# Patient Record
Sex: FEMALE | Race: WHITE | URBAN - NONMETROPOLITAN AREA
[De-identification: names, ages, dates, MRNs, and addresses within clinical notes are randomized per-mention and may not be internally consistent; named-entity substitution may affect disease eponyms.]

---

## 2017-01-01 ENCOUNTER — AMBULATORY - GICH (OUTPATIENT)
Dept: SCHEDULING | Facility: OTHER | Age: 82
End: 2017-01-01

## 2017-01-01 ENCOUNTER — COMMUNICATION - GICH (OUTPATIENT)
Dept: FAMILY MEDICINE | Facility: OTHER | Age: 82
End: 2017-01-01

## 2017-01-01 ENCOUNTER — AMBULATORY - GICH (OUTPATIENT)
Dept: LAB | Facility: OTHER | Age: 82
End: 2017-01-01

## 2017-01-01 ENCOUNTER — HISTORY (OUTPATIENT)
Dept: EMERGENCY MEDICINE | Facility: OTHER | Age: 82
End: 2017-01-01

## 2017-01-01 ENCOUNTER — COMMUNICATION - GICH (OUTPATIENT)
Dept: INTERNAL MEDICINE | Facility: OTHER | Age: 82
End: 2017-01-01

## 2017-01-01 ENCOUNTER — ONCOLOGY VISIT (OUTPATIENT)
Dept: RADIATION ONCOLOGY | Facility: HOSPITAL | Age: 82
End: 2017-01-01
Attending: RADIOLOGY
Payer: COMMERCIAL

## 2017-01-01 ENCOUNTER — OFFICE VISIT - GICH (OUTPATIENT)
Dept: INTERNAL MEDICINE | Facility: OTHER | Age: 82
End: 2017-01-01

## 2017-01-01 ENCOUNTER — TRANSFERRED RECORDS (OUTPATIENT)
Dept: HEALTH INFORMATION MANAGEMENT | Facility: HOSPITAL | Age: 82
End: 2017-01-01

## 2017-01-01 ENCOUNTER — HISTORY (OUTPATIENT)
Dept: INTERNAL MEDICINE | Facility: OTHER | Age: 82
End: 2017-01-01

## 2017-01-01 VITALS
HEART RATE: 76 BPM | WEIGHT: 173.2 LBS | DIASTOLIC BLOOD PRESSURE: 64 MMHG | BODY MASS INDEX: 28.86 KG/M2 | SYSTOLIC BLOOD PRESSURE: 150 MMHG | HEIGHT: 65 IN | RESPIRATION RATE: 20 BRPM

## 2017-01-01 DIAGNOSIS — I10 ESSENTIAL (PRIMARY) HYPERTENSION: ICD-10-CM

## 2017-01-01 DIAGNOSIS — R55 SYNCOPE AND COLLAPSE: ICD-10-CM

## 2017-01-01 DIAGNOSIS — E87.6 HYPOKALEMIA: ICD-10-CM

## 2017-01-01 DIAGNOSIS — R59.0 LOCALIZED ENLARGED LYMPH NODES: ICD-10-CM

## 2017-01-01 DIAGNOSIS — C34.91 MALIGNANT NEOPLASM OF UNSPECIFIED PART OF RIGHT BRONCHUS OR LUNG (H): ICD-10-CM

## 2017-01-01 DIAGNOSIS — D53.9 NUTRITIONAL ANEMIA: ICD-10-CM

## 2017-01-01 DIAGNOSIS — C34.90 MALIGNANT NEOPLASM OF UNSPECIFIED PART OF UNSPECIFIED BRONCHUS OR LUNG (H): ICD-10-CM

## 2017-01-01 DIAGNOSIS — C34.91 MALIGNANT NEOPLASM OF RIGHT LUNG, UNSPECIFIED PART OF LUNG (H): Primary | ICD-10-CM

## 2017-01-01 DIAGNOSIS — E78.5 HYPERLIPIDEMIA: ICD-10-CM

## 2017-01-01 LAB
MAGNESIUM SERPL-MCNC: 1.9 MG/DL (ref 1.9–2.7)
POTASSIUM SERPL-SCNC: 4.2 MMOL/L (ref 3.5–5.1)

## 2017-01-01 PROCEDURE — 99213 OFFICE O/P EST LOW 20 MIN: CPT | Performed by: RADIOLOGY

## 2017-01-01 RX ORDER — ISOSORBIDE DINITRATE 30 MG/1
TABLET ORAL
COMMUNITY

## 2017-01-01 RX ORDER — ROSUVASTATIN CALCIUM 40 MG/1
40 TABLET, COATED ORAL
COMMUNITY

## 2017-01-01 RX ORDER — ASPIRIN 325 MG
325 TABLET ORAL
COMMUNITY
Start: 2014-07-23

## 2017-01-01 RX ORDER — CYANOCOBALAMIN (VITAMIN B-12) 250 MCG
250 TABLET ORAL
COMMUNITY
Start: 2013-04-02

## 2017-01-01 RX ORDER — METHYLPREDNISOLONE 16 MG/1
16 TABLET ORAL
COMMUNITY

## 2017-01-01 RX ORDER — AMOXICILLIN 500 MG
CAPSULE ORAL
COMMUNITY

## 2017-01-01 RX ORDER — LORAZEPAM 0.5 MG/1
TABLET ORAL
COMMUNITY
Start: 2016-01-01

## 2017-01-01 RX ORDER — NITROGLYCERIN 0.4 MG/1
0.4 TABLET SUBLINGUAL
COMMUNITY
Start: 2016-01-01

## 2017-01-01 RX ORDER — ASCORBIC ACID 500 MG
500 TABLET ORAL
COMMUNITY

## 2017-01-01 RX ORDER — SENNOSIDES 8.6 MG
650 CAPSULE ORAL
COMMUNITY

## 2017-01-01 RX ORDER — FOLIC ACID 0.8 MG
800 TABLET ORAL
COMMUNITY
Start: 2016-01-01

## 2017-01-01 RX ORDER — VALSARTAN 320 MG/1
320 TABLET ORAL
COMMUNITY

## 2017-01-01 RX ORDER — METOPROLOL SUCCINATE 100 MG/1
100 TABLET, EXTENDED RELEASE ORAL
COMMUNITY

## 2017-01-01 ASSESSMENT — PATIENT HEALTH QUESTIONNAIRE - PHQ9
SUM OF ALL RESPONSES TO PHQ QUESTIONS 1-9: 10
SUM OF ALL RESPONSES TO PHQ QUESTIONS 1-9: 0

## 2017-01-01 ASSESSMENT — PAIN SCALES - GENERAL: PAINLEVEL: NO PAIN (0)

## 2017-01-09 PROBLEM — C34.90 LUNG CANCER (H): Status: ACTIVE | Noted: 2017-01-01

## 2017-01-09 NOTE — MR AVS SNAPSHOT
"              After Visit Summary   2017    Fatou Yadav    MRN: [id number]           Patient Information     Date Of Birth          1935        Visit Information        Provider Department      2017 1:15 PM Sampson Dixon MD [facility]        Today's Diagnoses     Malignant neoplasm of right lung, unspecified part of lung ([facility])    -  1        Follow-ups after your visit        Who to contact     If you have questions or need follow up information about today's clinic visit or your schedule please contact [facility] directly at [phone].  Normal or non-critical lab and imaging results will be communicated to you by Tychehart, letter or phone within 4 business days after the clinic has received the results. If you do not hear from us within 7 days, please contact the clinic through Macromill[employer] or phone. If you have a critical or abnormal lab result, we will notify you by phone as soon as possible.  Submit refill requests through [employer] or call your pharmacy and they will forward the refill request to us. Please allow 3 business days for your refill to be completed.          Additional Information About Your Visit        MyChart Information     [employer] lets you send messages to your doctor, view your test results, renew your prescriptions, schedule appointments and more. To sign up, go to [email or web address] . Click on \"Log in\" on the left side of the screen, which will take you to the Welcome page. Then click on \"Sign up Now\" on the right side of the page.     You will be asked to enter the access code listed below, as well as some personal information. Please follow the directions to create your username and password.     Your access code is: [email or web address]  Expires: 2017  1:55 PM     Your access code will  in 90 days. If you need help or a new code, please call your [facility] clinic or [phone].        Care EveryWhere ID     This is your Care EveryWhere ID. This " "could be used by other organizations to access your [place] medical records  [id number]        Your Vitals Were     Pulse Respirations Height BMI (Body Mass Index)          76 20 1.651 m (5' 5\") 28.82 kg/m2         Blood Pressure from Last 3 Encounters:   01/09/17 150/64    Weight from Last 3 Encounters:   01/09/17 78.563 kg (173 lb 3.2 oz)              Today, you had the following     No orders found for display       Primary Care Provider    None Specified       No primary provider on file.        Thank you!     Thank you for choosing [facility]  for your care. Our goal is always to provide you with excellent care. Hearing back from our patients is one way we can continue to improve our services. Please take a few minutes to complete the written survey that you may receive in the mail after your visit with us. Thank you!             Your Updated Medication List - Protect others around you: Learn how to safely use, store and throw away your medicines at www.disposemymeds.org.          This list is accurate as of: 1/9/17  1:55 PM.  Always use your most recent med list.                   Brand Name Dispense Instructions for use    ascorbic acid 500 MG tablet    VITAMIN C     Take 500 mg by mouth       aspirin 325 MG tablet      Take 325 mg by mouth       B-12 250 MCG Tabs      Take 250 mcg by mouth       calcium carbonate-vitamin D 600-200 MG-UNIT Tabs          COMPAZINE PO      Take 10 mg by mouth every 6 hours as needed for nausea       CRESTOR 40 MG tablet   Generic drug:  rosuvastatin      40 mg       Fish Oil 1200 MG Caps          folic acid 800 MCG Tabs      Take 800 mcg by mouth       isosorbide dinitrate 30 MG tablet    ISORDIL         LORazepam 0.5 MG tablet    ATIVAN         MAGIC MOUTHWASH (DUKE) (FV COMPOUNDED) SUSPENSION      5 mLs       magnesium citrate solution          methylPREDNISolone 16 MG tablet    MEDROL     Take 16 mg by mouth       nitroglycerin 0.4 MG sublingual tablet    " NITROSTAT     Place 0.4 mg under the tongue       PRESERVISION AREDS 2 PO      Take 2 capsules by mouth       TOPROL  MG 24 hr tablet   Generic drug:  metoprolol      Take 100 mg by mouth       TYLENOL ARTHRITIS PAIN 650 MG CR tablet   Generic drug:  acetaminophen      Take 650 mg by mouth       valsartan 320 MG tablet    DIOVAN     Take 320 mg by mouth

## 2017-01-09 NOTE — PROGRESS NOTES
"INITIAL PATIENT ASSESSMENT    Referring Physician: Juan C  Other Physicians: Wanda Mathias; Domingo Greene    Diagnosis: Lung Cancer    Prior radiation therapy: None    Prior chemotherapy:   Protocol: carbo/ premetrexed   Facility: [facility]  Dates: 16-16      Prior hormonal therapy:Yes: HRT x 10 yrs    Pain Eval:  Denies    Psychosocial  Marital Status:    Spouse/Significant other: n/a    Children: no living   Occupation: [employer] service/ [employer]    Retired: Yes  Living arrangements: home alone  Do you feel safe at home? Yes  Activity status: independent   referral needs: Not needed    Advanced Directive: Yes - Location: pt will bring a copy in    Breast Exam:   Mammogram:   Last Pap: many years  : 2  Para: 1  Onset of menarche: 15  LMP: No LMP recorded.  Onset of menopause: 34 (hysterectomy)  Abnormal vaginal bleeding/discharge: No  Are you pregnant? no  Reproductive note: n/a    Patient was assessed using the NCCN psychosocial distress thermometer. Patient rated the score as a 7/10. Patient rated current stressors as fatigue, \"I can't do what I want to, I haven't got the ambition\". Pt confides in her . Stressors will be brought to the attention of provider or Oncology RN Care Coordinator for a score of 6 or greater or per nurses discretion.     Pt is here today for a consult for radiation therapy for lung cancer.  Educated patient on the mapping process and the possible side effects of XRT to the chest, to include: fatigue, skin reaction, and esophogitis.  Pt verbalizes an understanding and has no questions at this time. Pt is accompanied by her two friends, today.      ROLevel 3- Verification of admission data and rooming completed.  Verification of medication and allergies completed.  Education per individual patient/family needs completed and documented.  Assessment and side-effect management completed.  Necessary information gathered and reported to " provider, time spent assisting patient or coordinating patient needs approximately 45 minutes.

## 2017-01-10 NOTE — CONSULTS
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      HISTORY OF PRESENT ILLNESS:  Fatou Yadav describes seeking Healthcare in the spring of 2016 with upper respiratory allergy symptoms and eventually seeing a physician for left-sided thoracic chest pain in 2016.  She was ultimately found to have a right lower lobe tumor with extensive mediastinal adenopathy, biopsies shown to be adenocarcinoma.  Because of the rather extensive volume of disease, Dr. Irizarry felt it was in her best interest to proceed with systemic therapy hoping for a response and considering possibly moving on to combined modality therapy in the future.  She has received several cycles, I believe, of carboplatin and pemetrexed and on imaging studies has been shown to have fairly marked progression of her disease.  She reports feeling fairly fatigued, but not having too much in the way of specific focal symptoms related to her malignancy.  She does have a tender mass in the right neck.      PAST MEDICAL HISTORY:  Hysterectomy in  which she describes being for cervical carcinoma, coronary bypass surgery, coronary stents.      INTERCURRENT ILLNESSES:  Coronary artery disease, hypertension, hyperlipidemia, also bladder carcinoma.  She is apparently not diabetic in spite of her ischemic vascular disease.  No clear history of MI or CVA.      ALLERGIES:  Amoxicillin, Plavix, morphine, niacin.      REPRODUCTIVE HISTORY:   2, para 1; menarche age 15; menopause, surgical, early 30s.      REVIEW OF SYSTEMS:  No diplopia, headaches, dizziness, loss of consciousness.  No swallowing difficulties, odynophagia, dysphagia.  No thermal or temperature intolerance.  No nausea, vomiting, heartburn.  No orthopnea, dyspnea, palpitations.  No fevers,  chills, night sweats.  Energy level is down significantly.  Denies any skeletal aches or pains.  Weight is down 35-40 pounds over the past 10 months.      FAMILY HISTORY:  Brother had prostate carcinoma.  Father, pancreatic carcinoma.  Mother had breast carcinoma.  She has also had a niece with breast carcinoma and a brother with some sort of malignancy which she describes involving his spleen.      HABITS:  Alcohol use, occasional.  Tobacco, quit in 1996.      SOCIAL AND DEMOGRAPHIC:  This patient is , has no living children.  She is retired from the [employer] and [employer].  Lives at home alone.      PHYSICAL EXAMINATION:   GENERAL:  Reveals a cooperative, fairly healthy-appearing female.   VITAL SIGNS:  Weight is 173.2 pounds, blood pressure 150/69, heart rate 76.   HEENT:  Extraocular movements full.  Pupils equal, round, reactive.  Face is symmetric.  Palate and tongue midline and symmetric.     NECK:  No left cervical or supraclavicular lymphadenopathy.  There is what feels like a very good-sized right lower neck mass, bilobed or perhaps 2 separate lymph node masses, quite tender, measuring up to 4-5 cm in greatest dimension.   LUNGS:  Auscultation of the lungs reveals fairly diffuse coarse rales in the right lung field posteriorly, left side is relatively clear.   HEART:  Cardiac exam reveals regular rate and rhythm without murmurs or extra sounds.   CHEST:  No obvious axillary lymphadenopathy.   ABDOMEN:  Nontender without appreciable organomegaly.  No inguinal lymphadenopathy.   EXTREMITIES:  Strength is intact.   NEUROLOGIC:  Deep tendon reflexes symmetric.      RADIOGRAPHIC FINDINGS:  Prechemotherapy imaging shows a large right perihilar soft tissue mass and mediastinal adenopathy, quite extensive.  Followup imaging after her treatment does show progression of this extensive right lower lobe malignancy, again with what appears to be central necrosis, interval development of pleural effusion,  more post-obstructive atelectasis of the right middle and lower lobes, and again extensive mediastinal adenopathy extending out of the mediastinum in the right inferior in the cervical region.        IMPRESSION:  Regionally extensive and advanced adenocarcinoma, presumably lung primary.  I certainly think at this time radiation therapy could only play a minor palliative role.  I would certainly think that if she could get a response to systemic treatment, that may be in her best interest.  She actually expresses desire for possible second opinion referral elsewhere.  She seems comfortable with our discussion and will continue with her care, as directed by Dr. Irizarry.  I would be happy to reevaluate her at any time.         TRINA FUENTES MD             D: 2017 14:39   T: 2017 16:02   MT: HILARIO      Name:     EUGENIO MENESES   MRN:      [id number]        Account:       [id number]   :      1935           Consult Date:  2017      Document: [id number]       cc: Wanda Irizarry MD

## 2017-02-28 ENCOUNTER — AMBULATORY - GICH (OUTPATIENT)
Dept: SCHEDULING | Facility: OTHER | Age: 82
End: 2017-02-28

## 2018-01-03 NOTE — TELEPHONE ENCOUNTER
Patient Information     Patient Name MRN Fatou Villafana [id number] Female 1935      Telephone Encounter by Natalya Castorena RN at 2017  2:44 PM     Author:  Natalya Castorena RN Service:  (none) Author Type:  NURS- Registered Nurse     Filed:  2017  2:56 PM Encounter Date:  2017 Status:  Signed     :  Natalya Castorena RN (NURS- Registered Nurse)            Edwige from [facility] , FYI -Patient did  the powder packets however K-dur that was sent over is not covered by insurance but Klor-Con M20 is.     1) there are 2 sets of directions- take 2 packets to equal 40 mEq for 5 days then 1 package daily     2)-the ordered later sent over for K-dur is to 2 tabs daily once daily       Pharmacy requests physician consideration and a callback today please      Natalya Castorena RN ....................  2017   2:54 PM

## 2018-01-03 NOTE — TELEPHONE ENCOUNTER
Patient Information     Patient Name MRN Fatou Villafana [id number] Female 1935      Telephone Encounter by Jasmin Hwang at 2017  9:55 AM     Author:  Jasmin Hwang Service:  (none) Author Type:  (none)     Filed:  2017  9:55 AM Encounter Date:  2017 Status:  Signed     :  Jasmin Hwang            Left a verbal message on Hospice machine  Jasmin Hwang ....................  2017   9:55 AM

## 2018-01-03 NOTE — TELEPHONE ENCOUNTER
Patient Information     Patient Name MRN Fatou Villafana [id number] Female 1935      Telephone Encounter by Jasmin Hwang at 2017  3:30 PM     Author:  Jasmin Hwang Service:  (none) Author Type:  (none)     Filed:  2017  3:31 PM Encounter Date:  2017 Status:  Signed     :  Jasmin Hwang            Taken care of.  Jasmin Hwang ....................  2017   3:31 PM

## 2018-01-03 NOTE — TELEPHONE ENCOUNTER
Patient Information     Patient Name MRN Fatou Villafana [id number] Female 1935      Telephone Encounter by Domingo Greene MD at 2017  5:27 PM     Author:  Domingo Greene MD Service:  (none) Author Type:  Physician     Filed:  2017  5:28 PM Encounter Date:  2017 Status:  Signed     :  Domingo Greene MD (Physician)            I was able to access patient's labs through care everywhere.  Her potassium yesterday was only 2.9.    This patient having issues with generalized weakness/fatigue, muscle spasms or leg cramps -- she may need to be hospitalized for her low potassium.    Domingo Greene MD

## 2018-01-03 NOTE — TELEPHONE ENCOUNTER
Patient Information     Patient Name MRN Fatou Villafana [id number] Female 1935      Telephone Encounter by Wanda Mathias MD at 2017 12:12 PM     Author:  Wanda Mathias MD Service:  (none) Author Type:  Physician     Filed:  2017 12:13 PM Encounter Date:  2017 Status:  Signed     :  Wanda Mathias MD (Physician)            New prescription for Potassium Chloride 20 meq tablets sent to [facility] pharmacy.  Wanda Mathias MD ....................  2017   12:13 PM

## 2018-01-03 NOTE — TELEPHONE ENCOUNTER
Patient Information     Patient Name MRN Fatou Villafana [id number] Female 1935      Telephone Encounter by Domingo Greene MD at 2017  4:21 PM     Author:  Domingo Greene MD Service:  (none) Author Type:  Physician     Filed:  2017  4:21 PM Encounter Date:  2017 Status:  Signed     :  Domingo Greene MD (Physician)            [facility] for hospice.     Domingo Greene MD

## 2018-01-03 NOTE — TELEPHONE ENCOUNTER
Patient Information     Patient Name MRN Sex Fatou Petit [id number] Female 1935      Telephone Encounter by Taryn Noriega at 2017  2:13 PM     Author:  Taryn Noriega Service:  (none) Author Type:  (none)     Filed:  2017  2:15 PM Encounter Date:  2017 Status:  Signed     :  Taryn Noriega            After the patient's name and date of birth were verified. Patient had stated that she has seen Dr. Irizarry in [place] and had recommend that patient starts taking potassium due to recent labs results. Patient is wondering if Domingo Greene MD will send in a prescription for potassium.

## 2018-01-03 NOTE — TELEPHONE ENCOUNTER
Patient Information     Patient Name MRFatou Sanchez [id number] Female 1935      Telephone Encounter by Kriss Mosley at 2017  8:15 AM     Author:  Kriss Mosley Service:  (none) Author Type:  (none)     Filed:  2017  8:17 AM Encounter Date:  2017 Status:  Signed     :  Kriss Mosley            Patient would like a prescription for a life alert device and for a walker that has wheels and a seat. Patient would like the prescriptions sent to [place].    Kriss Mosley ....................  2017   8:16 AM

## 2018-01-03 NOTE — TELEPHONE ENCOUNTER
Patient Information     Patient Name MRN Fatou Villafana [id number] Female 1935      Telephone Encounter by Sukhwinder Perez LPN at 2017  3:43 PM     Author:  Sukhwinder Perez LPN Service:  (none) Author Type:  NURS- Licensed Practical Nurse     Filed:  2017  3:43 PM Encounter Date:  2017 Status:  Signed     :  Sukhwinder Perez LPN (NURS- Licensed Practical Nurse)            Needs verbal order for Hospice. Please advise.  Sukhwinder Perez LPN ..............2017 3:43 PM

## 2018-01-03 NOTE — TELEPHONE ENCOUNTER
Patient Information     Patient Name MRN Fatou Villafana [id number] Female 1935      Telephone Encounter by Taryn Noriega at 2017  4:33 PM     Author:  Taryn Noriega Service:  (none) Author Type:  (none)     Filed:  2017  4:38 PM Encounter Date:  2017 Status:  Signed     :  Taryn Noriega            After the patient's name and date of birth were verified. Patient notified nurse that labs are coming from [facility] and were done 16. Patient did request that labs be sent to [facility]. Patient was notified that we have not received the labs from [facility] as of 2017.  Patient stated that if Domingo Greene MD would like her to come in have labs drawn here if that would be easier she would be more then willing to come in for a lab appointment or she will just wait until we receive labs and get recommendations from Domingo Greene MD. Please advise.  Taryn Noriega CMA ([facility])........2017 4:38 PM

## 2018-01-03 NOTE — TELEPHONE ENCOUNTER
Patient Information     Patient Name Fatou Barros [id number] Female 1935      Telephone Encounter by Earnestine Wing at 2017 11:02 AM     Author:  Earnestine Wing Service:  (none) Author Type:  (none)     Filed:  2017 11:03 AM Encounter Date:  2017 Status:  Signed     :  Earnestine Wing            Spoke with patient and she states she has started the potassium with a dose yesterday and one today.  She states she is getting worse and that she is extremely tired and very weak.  Would you like to see her sooner or what would you like her to do?  Earnestine Wing LPN........................2017  11:02 AM

## 2018-01-03 NOTE — PROGRESS NOTES
Patient Information     Patient Name MRN Fatou Villafana [id number] Female 1935      Progress Notes by Domingo Greene MD at 2017  8:50 AM     Author:  Domingo Greene MD Service:  (none) Author Type:  Physician     Filed:  2017  1:13 PM Encounter Date:  2017 Status:  Signed     :  Domingo Greene MD (Physician)            Nursing Notes:   Sandra Cox  2017  9:19 AM  Signed  Patient presents to the clinic for follow up with hypertension and ED visit on 17 for general weakness due to lung cancer.    Sandra Cox LPN        2017 9:04 AM    Fatou Yadav presents to clinic today for:   Chief Complaint    Patient presents with      Follow Up     HPI: Ms. Yadav is a 81 y.o. female who presents today for evaluation of above.     (D53.9) Unspecified deficiency anemia  (primary encounter diagnosis)  (E87.6) Hypokalemia  (I10) Hypertension  (E78.5) Hyperlipidemia, unspecified hyperlipidemia type  (C34.91) Primary adenocarcinoma of right lung (HC)  (R59.0) Mediastinal adenopathy  (R59.0) Hilar adenopathy     Unspecified anemia, B12 shot today.  Having weakness and fatigue.  Last hemoglobin had improved compared to her labs just a couple months ago.  Her last chemotherapy was in early December.    + Patient's niece / POA is with her today.  She provides additional history.    + Started oral potassium packets, reports that the prescription that was sent to her pharmacy last week cost her nearly $800 out of pocket.  -- Subsequently potassium tablets were sent to pharmacy but she already picked up the potassium packets.    She was found to have enlargement of her right lung mass/adenocarcinoma.  She has a new adenopathy in her right supraclavicular area.  She is currently pending evaluation at [facility].    Hypokalemia, recently discontinued her hydrochlorothiazide.  She's been having some diarrhea issues.  + Recently noted to have low potassium, low  magnesium.  She was started on oral potassium replacement after her potassium returned back at 2.9.  Prescription was sent to pharmacy for oral potassium replacement.  She started this but the next day she had worsening symptoms and was advised to go to the emergency room.  While in the emergency room.  She was given IV fluids and IV potassium replacement.  At this point she reports her muscle weakness has improved somewhat but she still quite fatigued.    Advised that she does have metastatic lung cancer and also has anemia.  -- We talked about possible lab work today, she would like to have these scheduled for about one month from now.    + Exercise tolerance is not very good at this time.  She reports a lot of fatigue.    Ms. Yadav's Body mass index is 28.14 kg/(m^2). This is within the normal range for a 81 y.o. Normal range for ages 18-64 is between 18.5 and 24.9; normal range for ages 65+ is 23-30.   BP Readings from Last 1 Encounters:01/24/17 : 132/66  Ms. Boston blood pressure is out of the normal range for adults. Per JNC-8 guidelines normal adult blood pressure is < 120/80, pre-hypertensive is between 120/80 and 139/89, and hypertension is 140/90 or greater. Risks of hypertension were discussed. Patient's strategy will be reduced salt intake    Functional Capacity: less than or about  4 METS.   + some mild shortness of breath on exertion noted.   Patient reports no current symptoms of fevers, chills, nausea/vomiting.   No cough. No resting shortness of breath.   No change in bladder habits. No melena, hematochezia. No Hematuria.      + Reports stools are still quite soft but they're no longer as frequent or loose.  Volume has reduced.    No rashes. No palpitations.  No orthopnea/paroxysmal nocturnal dyspnea   No vision or hearing issues.   No significant mood issues   No bruising.     ANAYELI:  No flowsheet data found.    PHQ9:  PHQ Depression Screening 8/18/2016 1/24/2017   Date of PHQ exam (doc flow)  8/18/2016 1/24/2017   1. Lack of interest/pleasure 0 - Not at all 0 - Not at all   2. Feeling down/depressed 0 - Not at all 0 - Not at all   PHQ-2 TOTAL SCORE 0 0   3. Trouble sleeping 0 - Not at all 0 - Not at all   4. Decreased energy 0 - Not at all 0 - Not at all   5. Appetite change 0 - Not at all 0 - Not at all   6. Feelings of failure 0 - Not at all 0 - Not at all   7. Trouble concentrating 0 - Not at all 0 - Not at all   8. Activity level 0 - Not at all 0 - Not at all   9. Hurting yourself 0 - Not at all 0 - Not at all   PHQ-9 TOTAL SCORE 0 0   PHQ-9 Severity Level none none   Functional Impairment not difficult at all not difficult at all        I have personally reviewed the past medical history, past surgical history, medications, allergies, family and social history as listed below, on 1/24/2017.    Patient Active Problem List       Diagnosis  Date Noted     Mediastinal adenopathy  01/24/2017     Hypokalemia  01/18/2017     Lung cancer (HC)  10/20/2016     Wheezing  08/18/2016     History of tobacco use  08/18/2016     Hilar adenopathy  08/18/2016     Primary adenocarcinoma of right lung (HC)  08/18/2016     Coughing up blood  08/18/2016     Memory problem  04/29/2016     ALCALA (dyspnea on exertion)  04/05/2016     Hyponatremia  03/03/2016     Prediabetes - NEW - A1c 6.0% - 3/3/2016  03/03/2016     Finger pain, left - distal 3rd finger.   11/14/2014     Hyperglycemia  11/14/2014     ACP (advance care planning)  12/11/2013     S/P drug eluting coronary stent placement  07/01/2013     ALCALA (dyspnea on exertion)  04/10/2013     Abnormal cardiovascular stress test  04/10/2013     Abnormal nuclear cardiac imaging test  03/19/2013     -Lexiscan myoview 3/8/13:  Small area of mild ischemia within the basal septum.  EF >70%. No regional wall motion abnormalities.          CAD s/p CABG       -CABG x 3 5/19/1999:  LIMA to LAD, SVG to D1, SVG to PDA  -Angiogram 2/2/05:  pLAD 50%.  mRCA 100%.  LIMA to LAD patent, small.   SVG to D1 occluded.  SVG to PDA patent.  EF 65%.  -Angiogram/PCI 4/10/13:  Ostial/proximal LAD 70%, treated with ROMEL.  LIMA to LAD atretic.  SVG to D1 known to be occluded.  SVG to PDA patent.              Left carotid bruit  02/26/2013     Right carotid bruit  02/26/2013     Asymptomatic carotid artery stenosis without infarction  02/26/2013     Chronic diastolic heart failure (HC)  02/26/2013     CKD (chronic kidney disease) stage 3, GFR 30-59 ml/min  02/26/2013     Low HDL (under 40)  02/26/2013     CAROTID ARTERY DISEASE  06/27/2011     8/3/2011 Carotid Ultrasound - less than 60% stenosis bilaterally.        SKIN LESION  02/18/2011     BLADDER CANCER  12/03/2010     SYNCOPE  10/15/2010     LICHEN SCLEROSIS ET ATROPHICUS  10/07/2010     et atrophicus and right vulva biopsy 11/6/2006          RECTAL FISSURE       RECTAL POLYPS       FRACTURE, CLAVICLE, LEFT       SYMPTOMATIC MENOPAUSAL/FEMALE CLIMACTERIC STATES       Hypertension  06/12/2009     Hyperlipidemia  06/12/2009     Past Medical History      Diagnosis   Date     Abnormal cardiovascular stress test  4/10/2013     Abnormal nuclear cardiac imaging test  3/19/2013     -Lexiscan myoview 3/8/13:  Small area of mild ischemia within the basal septum.  EF >70%. No regional wall motion abnormalities.        ASHD (Arteriosclerotic Heart Disease): S/P CAB 3 VESSEL  6/12/2009     Asymptomatic carotid artery stenosis without infarction  2/26/2013     BLADDER CANCER  12/3/2010     Bladder cancer (HC)  2010      Bladder cancer      CAD (coronary artery disease)  1999     CAD (coronary artery disease), autologous vein bypass graft  2005     CAROTID ARTERY DISEASE  6/27/2011     8/3/2011 Carotid Ultrasound - less than 60% stenosis bilaterally.       Cervical cancer (HC)  1969     Cervical cancer      Chest pain, atypical  4/10/2013     Chronic diastolic heart failure (HC)  2/26/2013     CKD (chronic kidney disease) stage 3, GFR 30-59 ml/min  2/26/2013     Closed right  ankle fracture  about 2001     ALCALA (dyspnea on exertion)  4/10/2013     Epithelial cyst        Dysplastic squamous epithelial lesions palate      FRACTURE, CLAVICLE, LEFT       H/O myocardial perfusion scan  06/20/06     Normal myocardial perfusion scan with no infarction or ischemia, ejection fraction 70%      Hx of colonoscopy       Colonoscopy - sigmoid diverticulosis      Hx of CT scan of chest       Normal cardiac scans with 70% ejection fraction.       Hyperlipidemia  6/12/2009     Hypertension  6/12/2009     HYPERTENSION  1/5/2012     Left carotid bruit  2/26/2013     LICHEN SCLEROSIS ET ATROPHICUS  10/7/2010     et atrophicus and right vulva biopsy 11/6/2006       Low HDL (under 40)  2/26/2013     RECTAL FISSURE       RECTAL POLYPS       Right carotid bruit  2/26/2013     S/P CABG x 3  1999     SKIN LESION  2/18/2011     SYMPTOMATIC MENOPAUSAL/FEMALE CLIMACTERIC STATES       SYNCOPE  10/15/2010     Past Surgical History       Procedure   Laterality Date     Total abdominal hysterectomy   1969     secondary to cervial cancer       Appendectomy        Appendectomy       Anal surgery   1974     Rectal fissure repair and polyp removal       Fracture treatment        Left clavicle fracture with plating       Colposcopy   1985     Removal of dysplastic squamous epithelial lesions in the palate       Coronary artery bypass graft   1999     Bypass surgery with graft to LAD and right coronary artery and diagonal branch.        Colonoscopy screening   1998     Colonoscopy       Nm myocardial perfusion ef (ia)   06/20/06     Normal myocardial perfusion scan with no infarction or ischemia, ejection fraction 70%       Colonoscopy screening   9/17/09     Colonoscopy - sigmoid diverticulosis       Turp   2010       Transurethral bladder tumor resection with possible left ureteral stent and bladder instillation of chemotherapeutic agent - Dr. Ayden Manzanares - [place].       Turp   7/14/11      Transurethral bladder tumor  resection - [place] [facility]       Cataract removal   1/2011         scheduled for bilateral cataract surgery - [facility]       Cvl coronary angiogram with vein graft study   2003     [facility] - occluded vein graft to Diagonal branch. patent LIMA to LAD, patent SVG to PDA       Coronary stent placement   4/2013     LAD @ [facility]       Current Outpatient Prescriptions       Medication  Sig Dispense Refill     acetaminophen SR (TYLENOL ARTHRITIS) 650 mg Extended-Release tablet Take 1 tablet by mouth every 8 hours if needed for Pain. Max acetaminophen dose: 4000mg in 24 hrs.  0     ANTIOX #8/OM3/DHA/EPA/LUT/ZEAX (PRESERVISION AREDS 2 ORAL) Take 2 capsules by mouth once daily.       ascorbic acid (VITAMIN C) 500 mg tablet Daily.  0     aspirin 325 mg tablet Take 1 tablet by mouth once daily with a meal.  0     Blood Pressure Monitor [employer].  Diagnosis: 401.9, 414.00.  Length of need:  99. 1 Device 0     CALCIUM CARB/VIT D3/MINERALS (CALCIUM CARBONATE-VIT D3-MIN) 1,200 mgcalcium -1,000 unit Chew Take 1 tablet by mouth once daily.       cyanocobalamin (VITAMIN B12) 250 mcg tablet Take 1 tablet by mouth once daily.  0     isosorbide mononitrate (IMDUR) 30 mg extended release tablet 24 Hour Take 1 tablet by mouth once daily. 90 tablet 4     magnesium oxide (MAG-) 400 mg tablet Take 1 tablet by mouth 2 times daily. 200 tablet 3     metoprolol succinate (TOPROL XL) 100 mg Sustained-Release tablet Take 1 tablet by mouth once daily. 90 tablet 4     nitroglycerin (NITROSTAT) 0.4 mg sublingual tablet Place 1 tablet under the tongue every 5 minutes if needed. 1 Bottle 11     omega-3 fatty acids-vitamin E (FISH OIL) 1,000 mg cap Take 2 capsules by mouth once daily.       potassium chloride (KLOR-CON M20) 20 mEq Extended-Release tablet Take 1 tablet by mouth once daily with a meal. 90 tablet 3     rosuvastatin (CRESTOR) 40 mg tablet Take 1 tablet by mouth once daily. 90 tablet 4     valsartan (DIOVAN) 320 mg  tablet Take 1 tablet by mouth once daily. -- stop combo HCTZ 90 tablet 3     Allergies      Allergen   Reactions     Amoxicillin  Mental Status Change     Weakness      Morphine  Other - Describe In Comment Field     Patient does not remember      Niacin  Flushing     Plavix [Clopidogrel Bisulfate]  Hives     Family History       Problem   Relation Age of Onset     Cancer-pancreatic  Father       pancreatic cancer age 81        Cancer-breast  Mother       in  of unknown natural causes        Cancer  Brother       of lymphatic cancer at age 51 (Derian)       Stroke  Brother      ASCVD        Heart Disease  Son       of MI age 41       Heart Disease  Sister        of CHF       Heart Disease  Sister        of CHF       Family Status     Relation  Status     Mother     breast cancer,  in  of unknown natural causes       Father  at age 81     pancreatic cancer      Brother  at age 51     of lymphatic cancer      Brother     ASCVD      Son  at age 41     of MI      Sister  at age 81    CHF      Sister  at age 89    CHF      Brother     x3      Social History     Social History        Marital status:       Spouse name: N/A     Number of children:  N/A     Years of education:  N/A     Social History Main Topics          Smoking status:   Former Smoker      Types:  Cigarettes      Quit date:  1999      Smokeless tobacco:   Never Used      Alcohol use   0.0 oz/week     0 Standard drinks or equivalent per week        Comment: occasional beer 2 to 3 glasses a week        Drug use:   No      Sexual activity:   No      Other Topics   Concern      Service  No     Blood Transfusions  No     Caffeine Concern  No     3-4 cups a day average      Occupational Exposure  No     Hobby Hazards  No     Sleep Concern  Yes     Wakes up after 2-3 hours, lays for 1-2 hours back to sleep      Stress Concern  Yes      "Worries about things- for [facility]      Weight Concern  Yes     Thinks she's overweight      Special Diet  No     Back Care  Yes     Back aches, thinks from hips, has had cortisone shots      Exercise  No     Hips and legs hurt too much      Bike Helmet  No     Seat Belt  Yes     Self-Exams  Yes     Social History Narrative      for many years.  Lives by herself.  Has some nieces in the area.  Son is .     Pertinent ROS was performed and was negative as noted in HPI above.     EXAM:   Vitals:     17 0910   BP: 132/66   Pulse: 80   Temp: 98.4  F (36.9  C)   TempSrc: Tympanic   Weight: 75 kg (165 lb 4 oz)   Height: 1.632 m (5' 4.25\")     BP Readings from Last 3 Encounters:    17 132/66   17 160/83   10/20/16 136/60     Wt Readings from Last 3 Encounters:    17 75 kg (165 lb 4 oz)   17 75.8 kg (167 lb)   10/20/16 79.6 kg (175 lb 6.4 oz)     Estimated body mass index is 28.14 kg/(m^2) as calculated from the following:    Height as of this encounter: 1.632 m (5' 4.25\").    Weight as of this encounter: 75 kg (165 lb 4 oz).     EXAM:  Constitutional: ++ pale, well groomed / good hygiene, casual dress  ENT: Normocephalic, Atraumatic  Lymphatic Exam: ++ Large right supraclavicular node noted.  Pulmonary: Few bilateral crackles and wheezes noted, right greater than left.  Cardiovascular Exam: Regular rate and rhythm.  Trace edema.  Gastrointestinal Exam: Obese  Integument: + Very pale complexion  Neurologic Exam: CN 3-12 grossly intact   Musculoskeletal Exam: + Some mild generalized weakness noted. Moves upper and lower extremities symmetrically, No focal weakness  Gait and station appear grossly normal  Psychiatric Exam: Awake and Alert, Affect and mood appropriate  Speech is fluent, Thought process is normal    INVESTIGATIONS:  Results for orders placed or performed in visit on 17      POTASSIUM      Result  Value Ref Range    POTASSIUM 4.2 3.5 - 5.1 mmol/L "   MAGNESIUM      Result  Value Ref Range    MAGNESIUM 1.9 1.9 - 2.7 mg/dL     1/20/2017  WBC: 11.8     1/20/2017  RBC: 2.81  1/20/2017  HGB: 9.3  1/20/2017  HCT: 28.4   1/20/2017  MCV: 101    1/20/2017  MCH: 33.1   1/20/2017 MCHC: 32.8   1/20/2017  RDW: 18.0   1/20/2017  PLT: 184     ASSESSMENT AND PLAN:  Fatou was seen today for follow up.    Diagnoses and all orders for this visit:    Unspecified deficiency anemia  -     cyanocobalamin 1,000 mcg injection (VITAMIN B12); Inject 1 mL intramuscular one time.  -     FERRITIN; Future  -     RETICULOCYTES; Future  -     CBC W PLT NO DIFF; Future  -     VITAMIN B12; Future    Hypokalemia  -     magnesium oxide (MAG-) 400 mg tablet; Take 1 tablet by mouth 2 times daily.  -     COMPLETE METABOLIC PANEL; Future    Hypertension  -     metoprololsuccinate (TOPROL XL) 100 mg Sustained-Release tablet; Take 1 tablet by mouth once daily.  -     valsartan (DIOVAN) 320 mg tablet; Take 1 tablet by mouth once daily. -- stop combo HCTZ  -     COMPLETE METABOLIC PANEL; Future    Hyperlipidemia, unspecified hyperlipidemia type  -     rosuvastatin (CRESTOR) 40 mg tablet; Take 1 tablet by mouth once daily.    Primary adenocarcinoma of right lung (HC)    Mediastinal adenopathy    Hilar adenopathy      lab results and schedule of future lab studies reviewed with patient, reviewed diet, exercise and weight control, recommended sodium restriction    -- Expected clinical course discussed   -- Medications and their side effects discussed    25 minutes spent in face-to-face interaction with patient and niece (separate from separately billed procedures) with greater than 50% spent in counseling and care coordination of listed medical problems.      The ASCVD Risk score (Bharat CYRUS Jr., et al., 2013) failed to calculate for the following reasons:    The patient has a prior MCI or stroke diagnosis    Fatou is also recommended to eat a heart-healthy diet, do regular aerobic exercises,  maintain a desirable body weight, and avoid tobacco products. These recommendations are from the American Heart Association (AHA) which stresses the importance of lifestyle changes to lower cardiovascular disease risk.    Return in about 1 month (around 2/24/2017).    Patient Instructions   Vitamin B12 deficiency:    Vitamin B12 deficiency can cause numerous symptoms.  Fatigue and malaise, low energy levels, low blood counts or anemia, poor mood or depression, neuropathy or pins and needles/burning sensation of the hands and feet.    -- trial of B12 shot today.    -- If B12 shot improves your energy and your blood counts, we can do B12 shots every 3-4 weeks up to every 2 or 3 months if needed.    -- Consider B12 tablet or B-complex tablet -- once daily.     -- Some people are able to take and absorb oral B12 or B complex tablets.   -- Some people are NOT able to absorb oral B12 very well.    If you decide to proceed with oral B12 replacement, but your B12 levels do not improve, you likely will need to use B12 shots instead.        Potassium levels and magnesium levels today are now normal.    Continue magnesium twice daily.  Continue potassium packet once daily.    -- Once you run out of potassium packets okay to switch to oral potassium tablets instead    Return in approximately 1 month(s), or sooner as needed for follow-up with Dr. Greene.    Clinic : [phone]  Appointment line: [phone]    Domingo Greene MD

## 2018-01-03 NOTE — TELEPHONE ENCOUNTER
Patient Information     Patient Name MRN Fatou Villafana [id number] Female 1935      Telephone Encounter by Taryn Noriega at 2017  1:31 PM     Author:  Taryn Noriega Service:  (none) Author Type:  (none)     Filed:  2017  1:31 PM Encounter Date:  2017 Status:  Signed     :  Taryn Noriega            Left message to call back.  Taryn Noriega [facility] ....................  2017   1:31 PM

## 2018-01-03 NOTE — TELEPHONE ENCOUNTER
Patient Information     Patient Name MRN Fatou Villafana [id number] Female 1935      Telephone Encounter by Domingo Greene MD at 2017 11:10 AM     Author:  Domingo Greene MD Service:  (none) Author Type:  Physician     Filed:  2017 11:11 AM Encounter Date:  2017 Status:  Signed     :  Domingo Greene MD (Physician)            Would anticipate she needs to be hospitalized.  Recommend going to the emergency room    Domingo Greene MD

## 2018-01-03 NOTE — TELEPHONE ENCOUNTER
Patient Information     Patient Name MRFatou Sanchez [id number] Female 1935      Telephone Encounter by Earnestine Wing at 2017 11:21 AM     Author:  Earnestine Wing Service:  (none) Author Type:  (none)     Filed:  2017 11:22 AM Encounter Date:  2017 Status:  Signed     :  Earnestine Wing            Patient notified, she will be heading into the ER within the hour.  ER was notified  Earnestine Wing LPN........................2017  11:22 AM

## 2018-01-03 NOTE — TELEPHONE ENCOUNTER
Patient Information     Patient Name MRFatou Sanchez [id number] Female 1935      Telephone Encounter by Sunny Cartwright RN at 2017 11:26 AM     Author:  Sunny Cartwright RN Service:  (none) Author Type:  NURS- Registered Nurse     Filed:  2017 11:27 AM Encounter Date:  2017 Status:  Signed     :  Sunny Cartwright RN (NURS- Registered Nurse)            Klor-con 20meq powder is not covered under patients insurance, its over $700.  Please change to tablets.  SUNNY CARTWRIGHT RN ....................  2017   11:27 AM

## 2018-01-03 NOTE — TELEPHONE ENCOUNTER
Patient Information     Patient Name MRFatou Sanchez [id number] Female 1935      Telephone Encounter by Maite Reynoso at 2017 10:40 AM     Author:  Maite Reynoso Service:  (none) Author Type:  (none)     Filed:  2017 10:46 AM Encounter Date:  2017 Status:  Signed     :  Maite Reynoso            Patient is wondering if another provider is willing to send rx to [place] for a four wheeled walker with seat and brakes. She is aware that provider will not be in until next week, and is wondering if another provider would address, or would she need to wait for return of PCP?   She is also wondering if a rx can be sent in for life alert device, (do they need one for this?) she states she was told that she needs one for this, or does this need to wait for PCP? Please advise  Maite Reynoso LPN...................2017   10:42 AM

## 2018-01-03 NOTE — TELEPHONE ENCOUNTER
Patient Information     Patient Name MRN Fatou Villafana [id number] Female 1935      Telephone Encounter by Wanda Mathias MD at 2017  3:15 PM     Author:  Wanda Mathias MD Service:  (none) Author Type:  Physician     Filed:  2017  3:19 PM Encounter Date:  2017 Status:  Signed     :  Wanda Mathias MD (Physician)            The potassium powder has been discontinued as it is not covered.  Ok to change the KDur 20 meq 2 by mouth daily to KlorCon 20 meq 1 by mouth daily instead.  I will send an updated order.  Wanda Mathias MD ....................  2017   3:16 PM

## 2018-01-03 NOTE — TELEPHONE ENCOUNTER
Patient Information     Patient Name MRN Fatou Villafana [id number] Female 1935      Telephone Encounter by Elzbieta Obrien at 2017  1:27 PM     Author:  Elzbieta Obrien Service:  (none) Author Type:  (none)     Filed:  2017  1:29 PM Encounter Date:  2017 Status:  Signed     :  Elzbieta Obrien            Patient is requesting an RX for potassium be called in to [facility]

## 2018-01-03 NOTE — PATIENT INSTRUCTIONS
Patient Information     Patient Name MRFatou Sanchez [id number] Female 1935      Patient Instructions by Domingo Greene MD at 2017  8:50 AM     Author:  Domingo Greene MD Service:  (none) Author Type:  Physician     Filed:  2017  9:37 AM Encounter Date:  2017 Status:  Signed     :  Domingo Greene MD (Physician)            Vitamin B12 deficiency:    Vitamin B12 deficiency can cause numerous symptoms.  Fatigue and malaise, low energy levels, low blood counts or anemia, poor mood or depression, neuropathy or pins and needles/burning sensation of the hands and feet.    -- trial of B12 shot today.    -- If B12 shot improves your energy and your blood counts, we can do B12 shots every 3-4 weeks up to every 2 or 3 months if needed.    -- Consider B12 tablet or B-complex tablet -- once daily.     -- Some people are able to take and absorb oral B12 or B complex tablets.   -- Some people are NOT able to absorb oral B12 very well.    If you decide to proceed with oral B12 replacement, but your B12 levels do not improve, you likely will need to use B12 shots instead.        Potassium levels and magnesium levels today are now normal.    Continue magnesium twice daily.  Continue potassium packet once daily.    -- Once you run out of potassium packets okay to switch to oral potassium tablets instead    Return in approximately 1 month(s), or sooner as needed for follow-up with Dr. Greene.    Clinic : [phone]  Appointment line: [phone]

## 2018-01-03 NOTE — TELEPHONE ENCOUNTER
Patient Information     Patient Name MRN Fatou Villafana [id number] Female 1935      Telephone Encounter by Domingo Greene MD at 2017  2:19 PM     Author:  Domingo Greene MD Service:  (none) Author Type:  Physician     Filed:  2017  2:20 PM Encounter Date:  2017 Status:  Signed     :  Domingo Greene MD (Physician)            Does she have labs that we cannot see in the chart?  Her last potassium at [facility] was normal.    Domingo Greene MD

## 2018-01-27 VITALS
HEART RATE: 80 BPM | SYSTOLIC BLOOD PRESSURE: 132 MMHG | WEIGHT: 165.25 LBS | DIASTOLIC BLOOD PRESSURE: 66 MMHG | HEIGHT: 64 IN | BODY MASS INDEX: 28.21 KG/M2 | TEMPERATURE: 98.4 F

## 2018-01-30 ASSESSMENT — PATIENT HEALTH QUESTIONNAIRE - PHQ9: SUM OF ALL RESPONSES TO PHQ QUESTIONS 1-9: 0

## 2018-07-23 NOTE — PROGRESS NOTES
Patient Information     Patient Name  Fatou Yadav MRN  [id number] Sex  Female   1935      Letter by Domingo Greene MD at      Author:  Domingo Greene MD Service:  (none) Author Type:  (none)    Filed:   Encounter Date:  2017 Status:  (Other)           Fatou Yadav  [address]  [facility] [address]          2017    Dear Ms. Yadav:    Following are the tests completed during your last clinic visit.  The results of these tests are included below.        Results for orders placed or performed in visit on 17      POTASSIUM      Result  Value Ref Range    POTASSIUM 4.2 3.5 - 5.1 mmol/L   MAGNESIUM      Result  Value Ref Range    MAGNESIUM 1.9 1.9 - 2.7 mg/dL         If you have any further questions or problems contact my office at  [phone]   --- or send [employer] message --- otherwise schedule an appointment.    Clinic : [phone]  Appointment line: [phone]     Thank you,    Domingo Greene MD    Internal Medicine  [facility]     Reviewed and electronically signed by provider.